# Patient Record
Sex: MALE | Race: WHITE | Employment: OTHER | URBAN - METROPOLITAN AREA
[De-identification: names, ages, dates, MRNs, and addresses within clinical notes are randomized per-mention and may not be internally consistent; named-entity substitution may affect disease eponyms.]

---

## 2018-03-28 PROBLEM — M22.42 CHONDROMALACIA PATELLAE OF LEFT KNEE: Status: ACTIVE | Noted: 2018-03-28

## 2019-03-05 ENCOUNTER — HOSPITAL ENCOUNTER (OUTPATIENT)
Dept: CT IMAGING | Age: 54
Discharge: HOME | End: 2019-03-05
Attending: FAMILY MEDICINE

## 2019-03-05 DIAGNOSIS — Z13.6 SCREENING FOR HEART DISEASE: ICD-10-CM

## 2021-06-15 PROCEDURE — 88305 TISSUE EXAM BY PATHOLOGIST: CPT | Performed by: OTOLARYNGOLOGY

## 2021-07-07 PROBLEM — M22.41 CHONDROMALACIA OF RIGHT PATELLA: Status: ACTIVE | Noted: 2021-07-07

## 2022-04-30 ENCOUNTER — APPOINTMENT (OUTPATIENT)
Dept: GENERAL RADIOLOGY | Age: 57
End: 2022-04-30
Attending: PHYSICIAN ASSISTANT
Payer: COMMERCIAL

## 2022-04-30 ENCOUNTER — HOSPITAL ENCOUNTER (EMERGENCY)
Age: 57
Discharge: HOME | End: 2022-04-30
Attending: EMERGENCY MEDICINE
Payer: COMMERCIAL

## 2022-04-30 VITALS
RESPIRATION RATE: 16 BRPM | OXYGEN SATURATION: 98 % | SYSTOLIC BLOOD PRESSURE: 122 MMHG | HEIGHT: 72 IN | DIASTOLIC BLOOD PRESSURE: 78 MMHG | BODY MASS INDEX: 25.06 KG/M2 | HEART RATE: 75 BPM | WEIGHT: 185 LBS | TEMPERATURE: 99 F

## 2022-04-30 DIAGNOSIS — S61.209A AVULSION OF SKIN OF FINGER WITHOUT COMPLICATION, INITIAL ENCOUNTER: Primary | ICD-10-CM
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
## 2022-04-30 PROCEDURE — 99284 EMERGENCY DEPT VISIT MOD MDM: CPT

## 2022-04-30 PROCEDURE — 73140 X-RAY EXAM OF FINGER(S): CPT | Performed by: PHYSICIAN ASSISTANT

## 2022-04-30 PROCEDURE — 12001 RPR S/N/AX/GEN/TRNK 2.5CM/<: CPT

## 2022-04-30 PROCEDURE — 90471 IMMUNIZATION ADMIN: CPT

## 2022-05-01 NOTE — ED PROVIDER NOTES
I reviewed that chart and discussed the case. I have examined the patient and noted left third digit laceration macerated with tissue loss. Distal phalanx. No bone exposed. Able to flex extend. Tetanus was updated. X-ray showed no fracture. .  3 sutures were placed and area of tissue avulsion and Gelfoam applied. Follow-up for further evaluation. Return if new or worse symptoms. I agree with the following clinical impression(s):  Avulsion of skin of finger without complication, initial encounter  (primary encounter diagnosis)  Contact with powered saw as cause of accidental injury. I agree with the plan as noted.

## 2022-05-12 ENCOUNTER — OFFICE VISIT (OUTPATIENT)
Dept: FAMILY MEDICINE CLINIC | Facility: CLINIC | Age: 57
End: 2022-05-12
Payer: COMMERCIAL

## 2022-05-12 DIAGNOSIS — Z48.02 VISIT FOR SUTURE REMOVAL: Primary | ICD-10-CM

## 2022-05-12 NOTE — PROGRESS NOTES
PROCEDURE: suture removal.  LOCATION: Left 3rd digit  WOUND EXAM:Avuslion healing, crusted with some granulation tissue around laceration. Wound dehiscence- none. Wound soaked for approx 5 minutes to soften up scab to be able to get to sutures. 3 sutures removed. DRESSING: dressing applied with antibiotic ointment. COMPLICATIONS: no complications, minimal   PATIENT STATUS: tolerated very well.

## 2024-02-08 ENCOUNTER — PROCEDURE VISIT (OUTPATIENT)
Dept: NEUROLOGY | Facility: CLINIC | Age: 59
End: 2024-02-08
Payer: COMMERCIAL

## 2024-02-08 DIAGNOSIS — M25.511 RIGHT SHOULDER PAIN, UNSPECIFIED CHRONICITY: Primary | ICD-10-CM

## 2024-02-08 NOTE — PROCEDURES
[place] [id number] [place] [id number]        Full Name: Navi Allen Gender: Male  Patient ID: [id number] YOB: 1965      Visit Date: 2/8/2024 9:18 AM  Age: 58 Years  Examining Physician: Dr. Chico Cornelius  Referring Physician: NYDIA Longoria      Sensory NCS      Nerve / Sites Rec. Site Onset Lat Peak Lat NP Amp PP Amp Segments Distance Velocity Comment     ms ms µV µV  cm m/s    R Median - Dig II (Antidromic)      Wrist Index 2.66 3.28 13.1 20.0 Wrist - Index 14 53    R Ulnar - Dig V (Antidromic)      Wrist Dig V 2.55 3.28 13.4 18.3 Wrist - Dig V 14 55    R Radial - Superficial (Antidromic)      Forearm Wrist 2.19 3.07 14.2 11.5 Forearm - Wrist 10 46        Motor NCS      Nerve / Sites Muscle Latency Amplitude Segments Dist. Lat Diff Velocity Comments     ms mV  cm ms m/s    R Median - APB      Wrist APB 3.69 6.3 Wrist - APB 8         Elbow APB 7.96 5.7 Elbow - Wrist 24 4.27 56.2    R Ulnar - ADM      Wrist ADM 3.04 12.0 Wrist - ADM 8         B.Elbow ADM 5.60 11.9 B.Elbow - Wrist  2.56        A.Elbow ADM 9.17 10.3 A.Elbow - B.Elbow 18 3.56 50.5        EMG Summary Table     Spontaneous MUAP Recruitment   Muscle IA Fib PSW Fasc H.F. Amp Dur. PPP Pattern   R. Deltoid N None None None None N N N N   R. Triceps brachii N None None None None N N N N   R. Biceps brachii N None None None None N N N N   R. Flexor carpi radialis N None None None None N N N N   R. Cervical paraspinals C5-C6 N None None None None           Summary    The motor conduction test was normal in all 2 of the tested nerves: R Median - APB, R Ulnar - ADM.    The R median, R ulnar and R radial SNAPs were normal.     The needle EMG study was normal in all 5 tested muscles: R. Deltoid, R. Triceps brachii, R. Biceps brachii, R. Flexor carpi radialis, R. Cervical paraspinals (mid).        Navi Allen [id number] 2/8/2024 9:18 AM     2 of 3    Conclusion:   This is a normal study. There is no  electrodiagnostic evidence of a RUE large fibre neuropathy or a R cervical radiculopathy at this time.       ________________________  Dr. Chico Cornelius  Neurology    Tab Allen [id number] 2/8/2024 9:18 AM     2 of 3